# Patient Record
Sex: MALE | URBAN - METROPOLITAN AREA
[De-identification: names, ages, dates, MRNs, and addresses within clinical notes are randomized per-mention and may not be internally consistent; named-entity substitution may affect disease eponyms.]

---

## 2019-12-23 ENCOUNTER — TELEPHONE (OUTPATIENT)
Dept: UROLOGY | Facility: MEDICAL CENTER | Age: 64
End: 2019-12-23

## 2019-12-23 NOTE — TELEPHONE ENCOUNTER
Npt called asking how soon he could get trus biopsy as he's been by seeing Barbi DANIELS with [facility] and they recommended biopsy due to elevated psa 5 7 however they are scheduled out to [facility] informed him he would need consultation visit with physician I did see June Liz has procedure openings 01/02 & 01/20 but no discussion slots only a npt slot 01/02/19 8:15 which will probably get filled sooner than later,pt is to fax directly to me [phone]  his lab results later today and I will forward to clinical triage        Complaint/Complaint: Elevated Psa    Insurance:MCR/[employer]    History of Cancer:NO    Previous urologist:[facility]    Outside testing/where:[facility]    If yes,what kind:psa    Records requested/where:pt will fax    Preferred location:[place]

## 2019-12-23 NOTE — TELEPHONE ENCOUNTER
Called and spoke to patient  Made patient aware that we did get his PSA results that were faxed over but that we need all of his records from his current urologist  Also made him aware that I can scheduled him for a [facility] appointment as he will need this prior to having any procedures done with us  Patient scheduled on 1/30/2020 at 9:00 with Dr Donald for [facility] appointment  Patient will have all of this records sent over prior to this appointment